# Patient Record
Sex: MALE | Race: WHITE | NOT HISPANIC OR LATINO | URBAN - METROPOLITAN AREA
[De-identification: names, ages, dates, MRNs, and addresses within clinical notes are randomized per-mention and may not be internally consistent; named-entity substitution may affect disease eponyms.]

---

## 2017-11-28 ENCOUNTER — IMPORTED ENCOUNTER (OUTPATIENT)
Dept: URBAN - METROPOLITAN AREA CLINIC 1 | Facility: CLINIC | Age: 72
End: 2017-11-28

## 2017-11-28 PROBLEM — D31.30: Noted: 2017-11-28

## 2017-11-28 PROBLEM — H04.123: Noted: 2017-11-28

## 2017-11-28 PROBLEM — H25.813: Noted: 2017-11-28

## 2017-11-28 PROBLEM — H35.372: Noted: 2017-11-28

## 2017-11-28 PROBLEM — H16.143: Noted: 2017-11-28

## 2017-11-28 PROBLEM — H43.812: Noted: 2017-11-28

## 2017-11-28 PROCEDURE — 92014 COMPRE OPH EXAM EST PT 1/>: CPT

## 2017-11-28 PROCEDURE — 92015 DETERMINE REFRACTIVE STATE: CPT

## 2017-11-28 NOTE — PATIENT DISCUSSION
1.  Osteoma OS- stable. Observe 2. Cataract OU: Observe for now without intervention. The patient was advised to contact us if any change or worsening of vision3. YUDI w/ PEK OU- Cont ATs BID OU Routinely. 4.  PVD w/o Tear OS- stable RD Precautions 5. Asteroid Hyalosis OS- RD precautions 6. Epiretinal Membrane OS - Observe for change. Letter to PCP Return for an appointment in 1 yr 30 glare with Dr. Petra Coleman.

## 2018-01-03 ENCOUNTER — HOSPITAL ENCOUNTER (OUTPATIENT)
Dept: PHYSICAL THERAPY | Age: 73
Discharge: HOME | End: 2018-01-03
Payer: MEDICARE

## 2018-01-03 PROCEDURE — G8978 MOBILITY CURRENT STATUS: HCPCS

## 2018-01-03 PROCEDURE — 97162 PT EVAL MOD COMPLEX 30 MIN: CPT

## 2018-01-03 PROCEDURE — 97112 NEUROMUSCULAR REEDUCATION: CPT

## 2018-01-03 PROCEDURE — G8979 MOBILITY GOAL STATUS: HCPCS

## 2018-01-03 NOTE — PROGRESS NOTES
[facility] AT [address], [address] [place] [address]  Phone: [phone]   [phone]  PLAN OF CARE / [address] [place] [address] PHYSICAL THERAPY SERVICES  Patient Name: Abdoulaye Carlin : 1945   Medical   Diagnosis: Vertigo [R42] Treatment Diagnosis: R42   Onset Date:      Referral Source: Cass Gaines MD [facility]): 1/3/2018   Prior Hospitalization: See medical history Provider #: [id number]   Prior Level of Function: Independent w/ ADL's   Comorbidities: HTN, acoustic neuroma   Medications: Verified on Patient Summary List   The Plan of Care and following information is based on the information from the initial evaluation.   ===========================================================================================  Assessment / key information:  Patient is a 68 y/o male presenting with chief c/o imbalance and occasional dizziness. Pt reports this has occurred increasingly over the past year. 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 Pt reports intermittent feeling of veering or imbalance with walking, changing positions, looking up/down, or turning. He denies any falls, and does not use an assistive device. He denies pain (0/10). 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 Patient presents with decreased gait speed and decreased foot clearance. Functional Gait Assessment score is 19/30 indicating elevated fall risk. Pt exhibits normal smooth pursuit and ocular movement with testing, but did show some increased sway attempting stationary gaze stabilization in standing position. Deficits and treatment plan were discussed, and patient was instructed in home exercises for vestibular training.  The patient should benefit from therapy services to progress toward functional goals and improve quality of life.  ===========================================================================================  History MEDIUM  Complexity : 1-2 comorbidities / personal factors will impact the outcome/ POC ; Examination HIGH Complexity : 4+ Standardized tests and measures addressing body structure, function, activity limitation and / or participation in recreation ; Presentation MEDIUM Complexity : Evolving with changing characteristics ; Decision Making MEDIUM Complexity : FOTO score of 26-74; Complexity MEDIUM  Problem List: impaired gait/ balance, decrease ADL/ functional abilitiies and decrease activity tolerance   Treatment Plan may include any combination of the following: Therapeutic activities, Neuromuscular re-education, Gait/balance training, Patient education and Functional mobility training  Patient / Family readiness to learn indicated by: asking questions, trying to perform skills and interest  Persons(s) to be included in education: patient (P)  Barriers to Learning/Limitations: None  Measures taken:    Patient Goal (s): Improve balance   Patient self reported health status: good  Rehabilitation Potential: good   Short Term Goals: To be accomplished in  4  treatments: Independent/compliant with gaze stabilization exercises 3x/day as directed  Progress to standing x2 card exercises without loss of balance to demonstrate equilibrium improvement   Long Term Goals: To be accomplished in  8  treatments:  Improve FOTO outcome score by 10% to indicate a significant improvement in ADL function  Pt will be able to perform walking with head turning/tilting without loss of balance  Improve Functional Gait Assessment score to 22/30 to indicate reduced fall risk  Frequency / Duration:   Patient to be seen  1  time per week for 8  weeks:  Patient / Caregiver education and instruction: activity modification and exercises  G-Codes (GP): Mobility [id number] Current  CJ= 20-39%   Goal  CJ= 20-39%.   The severity rating is based on the Other Functional Gait Assessment    Therapist Signature: Jennifer Booth PT, OCS Date: 1/4/0756   Certification Period: 1/3/18-4/2/18 Time: 3:20 PM   ===========================================================================================  I certify that the above Physical Therapy Services are being furnished while the patient is under my care. I agree with the treatment plan and certify that this therapy is necessary. Physician Signature:        Date:       Time:     Please sign and return to [facility] or you may fax the signed copy to [phone]. Thank you.

## 2018-01-08 ENCOUNTER — HOSPITAL ENCOUNTER (OUTPATIENT)
Dept: PHYSICAL THERAPY | Age: 73
Discharge: HOME | End: 2018-01-08
Payer: MEDICARE

## 2018-01-08 PROCEDURE — 97112 NEUROMUSCULAR REEDUCATION: CPT

## 2018-01-08 NOTE — PROGRESS NOTES
PT DAILY TREATMENT NOTE     Patient Name: Casey Cole  Date:2018  : 1945  [x]  Patient  Verified  [place]or: [place] MED[place]ARE / Plan: [place] MEDICARE PART A & B / Product Type: Medicare /    In time:11:33  Out time:12:01  Total Treatment Time (min): 28  Total Timed Codes (min): 28  1:1 Treatment Time (min): 28   Visit #: 2 of 8    Treatment Area: Vertigo [R42]    SUBJECTIVE  Pain Level (0-10 scale): 0  Any medication changes, allergies to medications, adverse drug reactions, diagnosis change, or new procedure performed?: [x] No    [] Yes (see summary sheet for update)  Subjective functional status/changes:   [] No changes reported  Pt reports no issues with HEP so far. The eye exercises did not cause dizziness. OBJECTIVE       28 min Neuromuscular Re-education:  []  See flow sheet :   Rationale: improve balance and increase proprioception  to improve the patients ability to safely perform standing/walking ADL's as well as basic changes of head and body positions    ther Objective/Functional Measures:     Pain Level (0-10 scale) post treatment: 0    ASSESSMENT/Changes in Function: Pt was progressed to x 2 card exercises today with no visible deficits. HEP updated. Pt requires frequent cueing with standing balance and gait activities to keep head at upright orientation and not downward. Patient will continue to benefit from skilled PT services to address functional mobility deficits and address imbalance/dizziness to attain remaining goals.      []  See Plan of Care  []  See progress note/recertification  []  See Discharge Summary         PLAN  []  Upgrade activities as tolerated     [x]  Continue plan of care  []  Update interventions per flow sheet       []  Discharge due to:_  []  Other:_      Livier Varghese, PT 2018  11:38 AM

## 2018-01-15 ENCOUNTER — HOSPITAL ENCOUNTER (OUTPATIENT)
Dept: PHYSICAL THERAPY | Age: 73
Discharge: HOME | End: 2018-01-15
Payer: MEDICARE

## 2018-01-15 PROCEDURE — 97112 NEUROMUSCULAR REEDUCATION: CPT

## 2018-01-15 NOTE — PROGRESS NOTES
PT DAILY TREATMENT NOTE -    Patient Name: Zay Dias  Date:1/15/2018  : 1945  [x]  Patient  Verified  Payor: [place] MEDICARE / Plan: [place] MEDICARE PART A & B / Product Type: Medicare /    In time:10:37  Out time:11:07  Total Treatment Time (min): 30  Total Timed Codes (min): 30  1:1 Treatment Time (min):    Visit #: 3 of 8    Treatment Area: Vertigo [R42]    SUBJECTIVE  Pain Level (0-10 scale): 0  Any medication changes, allergies to medications, adverse drug reactions, diagnosis change, or new procedure performed?: [x] No    [] Yes (see summary sheet for update)  Subjective functional status/changes:   [] No changes reported  My balance feels about the same as when I first started, it mainly gets me if I stand up too from a seated position. OBJECTIVE    30 min Neuromuscular Re-education:  []  See flow sheet :   Rationale: improve balance and increase proprioception  to improve the patients ability to safely perform standing/walking ADL's as well as basic changes of head and body positions        min Patient Education: [x] Review HEP    [] Progressed/Changed HEP based on:   [] positioning   [] body mechanics   [] transfers   [] heat/ice application        Other Objective/Functional Measures:     Pain Level (0-10 scale) post treatment: 0    ASSESSMENT/Changes in Function: Pt reports only reoccurrence of dizziness/disequilibrium symptoms with sit to stand transfers. Pt was also able to advance to addition of vestibular gaze card exercises x2 with min to mod challenge today with handouts given to had to HEP as well today. Will continue to progress/advance patient within current POC as tolerated with monitoring symptoms. Patient will continue to benefit from skilled PT services to modify and progress therapeutic interventions, analyze and cue movement patterns, address imbalance/dizziness and instruct in home and community integration to attain remaining goals.      []  See Plan of Care  []  See progress note/recertification  []  See Discharge Summary         Progress towards goals / Updated goals:      PLAN  [x]  Upgrade activities as tolerated     []  Continue plan of care  []  Update interventions per flow sheet       []  Discharge due to:_  []  Other:_      Germaine Preciado PTA 1/15/2018  10:36 AM

## 2018-01-22 ENCOUNTER — HOSPITAL ENCOUNTER (OUTPATIENT)
Dept: PHYSICAL THERAPY | Age: 73
Discharge: HOME | End: 2018-01-22
Payer: MEDICARE

## 2018-01-22 PROCEDURE — 97112 NEUROMUSCULAR REEDUCATION: CPT

## 2018-01-22 NOTE — PROGRESS NOTES
PT DAILY TREATMENT NOTE 8-    Patient Name: Lucillie Schilder  Date:2018  : 1945  [x]  Patient  Verified  Payor: [place] MEDICARE / Plan: [place] MEDICARE PART A & B / Product Type: Medicare /    In time:10:35  Out time:11:04  Total Treatment Time (min): 29  Total Timed Codes (min): 29  1:1 Treatment Time (min):    Visit #: 4 of 8    Treatment Area: Vertigo [R42]    SUBJECTIVE  Pain Level (0-10 scale): 0  Any medication changes, allergies to medications, adverse drug reactions, diagnosis change, or new procedure performed?: [x] No    [] Yes (see summary sheet for update)  Subjective functional status/changes:   [] No changes reported  I felt pretty good all weekend, no issues with my balance or any dizziness at all and I have been practicing with the new card exercises since I was here last time. OBJECTIVE     29 min Neuromuscular Re-education:  [x]  See flow sheet :   Rationale: improve balance and increase proprioception  to improve the patients ability to safely perform standing/walking ADL's as well as basic changes of head and body positions           min Patient Education: [x] Review HEP    [] Progressed/Changed HEP based on:   [] positioning   [] body mechanics   [] transfers   [] heat/ice application        Other Objective/Functional Measures:     Pain Level (0-10 scale) post treatment: 0    ASSESSMENT/Changes in Function: Pt presents with no new episodes of disequilibrium/vertigo and reports good compliance with addition of vestibular gaze card exercises x2 with HEP since last tx  Pt was also able to decrease ZOILA with most romberg stance exercises as well as increasing from sitting to standing with vestibular gaze card exercises x2 with min to mod challenge with proprioceptive/balance awareness today. Will continue to progress/advance patient within current POC as tolerated with monitoring symptoms.      Patient will continue to benefit from skilled PT services to modify and progress therapeutic interventions, analyze and cue movement patterns, address imbalance/dizziness and instruct in home and community integration to attain remaining goals.      []  See Plan of Care  [x]  See progress note/recertification  []  See Discharge Summary         Progress towards goals / Updated goals:      PLAN  [x]  Upgrade activities as tolerated     []  Continue plan of care  []  Update interventions per flow sheet       []  Discharge due to:_  []  Other:_      Joel Way, PTA 1/22/2018  10:37 AM

## 2018-01-29 ENCOUNTER — HOSPITAL ENCOUNTER (OUTPATIENT)
Dept: PHYSICAL THERAPY | Age: 73
Discharge: HOME | End: 2018-01-29
Payer: MEDICARE

## 2018-01-29 PROCEDURE — G8980 MOBILITY D/C STATUS: HCPCS

## 2018-01-29 PROCEDURE — G8979 MOBILITY GOAL STATUS: HCPCS

## 2018-01-29 PROCEDURE — 97530 THERAPEUTIC ACTIVITIES: CPT

## 2018-01-29 NOTE — PROGRESS NOTES
PT DAILY TREATMENT NOTE 8-14    Patient Name: Gal Sheets  Date:2018  : 1945  [x]  Patient  Verified  Payor: VA MEDICARE / Plan: VA MEDICARE PART A & B / Product Type: Medicare /    In time:10:32  Out time:10:55  Total Treatment Time (min): 23  Total Timed Codes (min): 23  1:1 Treatment Time (min):    Visit #: 5 of 8    Treatment Area: Vertigo [R42]    SUBJECTIVE  Pain Level (0-10 scale): 0  Any medication changes, allergies to medications, adverse drug reactions, diagnosis change, or new procedure performed?: [x] No    [] Yes (see summary sheet for update)  Subjective functional status/changes:   [] No changes reported  I am going to have surgery on my right inner ear next week to remove a cyst and some floaters, so I will be out of therapy for at least 2 weeks after the surgery. OBJECTIVE      23 mins: Therapeutic Activities: Rationale: Through reassessment of all goals and performed Functional Gait Assessment for Discharge Summary         min Patient Education: [x] Review HEP    [] Progressed/Changed HEP based on:   [] positioning   [] body mechanics   [] transfers   [] heat/ice application        Other Objective/Functional Measures:     Pain Level (0-10 scale) post treatment:     ASSESSMENT/Changes in Function:    []  See Plan of Care  []  See progress note/recertification  [x]  See Discharge Summary         Progress towards goals / Updated goals:  See discharge summary for full final detailed progress towards all established goals. PLAN  []  Upgrade activities as tolerated     []  Continue plan of care  []  Update interventions per flow sheet       [x]  Discharge due to:Discharge from therapy at this time after completing 5 of 8 prescribed treatments due to upcoming change in medical status with upcoming inner ear surgery scheduled next week. Pt may possibly return after surgery pending medical status/MD approval.  []  Other:_      Germaine Preciado PTA 2018  10:25 AM

## 2018-01-29 NOTE — PROGRESS NOTES
[facility] AT [address], [address] [place] [address]  Phone: [phone]   [phone]  DISCHARGE SUMMARY  Patient Name: Abdoulaye Carlin : 1945   Treatment/Medical Diagnosis: Vertigo [R42]   Referral Source: Cass Gaines MD     Date of Initial Visit: 1/3/18 Attended Visits: 5 Missed Visits: 0     SUMMARY OF TREATMENT  Therapeutic exercise for proprioceptive/balance awareness including various romberg stance exercises on level and unstable surfaces, ambulation style dynamic balance drills, vestibular gaze card exercises and HEP. 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 Pt has although made slow but steady progress with advancing with Romberg stance activities and vestibular gaze card exercise program over the past few visits. Pt reports that he is scheduled to have invasive surgery on R inner ear next week which shoulder help further decrease equilibrium issues. Pt is being discharged from therapy at this time pending upcoming surgery with possibility of returning as needed post operatively. Goal/Measure of Progress Goal Met? 1.  Progress to standing x2 card exercises without loss of balance to demonstrate equilibrium improvement   Status at last Eval: Progressing Current Status: Met yes   2. Improve FOTO outcome score by 10% to indicate a significant improvement in ADL function   Status at last Eval: 69/100 Current Status: 69/100 no   3. Pt will be able to perform walking with head turning/tilting without loss of balance   Status at last Eval: Progressing Current Status: Not Met no   4.   Improve Functional Gait Assessment score to 22/30 to indicate reduced fall risk Status at last Eval: Initial FGA score=19/30 Current Status: Current FGA score=22/30 yes   G-codes=Mobility   Goal  CJ= 20-39%  D/C  CJ= 20-39%. The severity rating is based on the FOTO Score  RECOMMENDATIONS  Discharge from therapy at this time after completing 5 of 8 prescribed treatments due to upcoming change in medical status with upcoming inner ear surgery scheduled next week. Pt may possibly return after surgery pending medical status/MD approval.    If you have any questions/comments please contact us directly at [phone]. Thank you for allowing us to assist in the care of your patient. LPTA Signature: Claire Fish PTA  Date: 1/29/2018   PT Signature: DARIA Gunderson, [facility] [place] Time: 10:18 AM   NOTE TO PHYSICIAN:  PLEASE COMPLETE THE ORDERS BELOW AND FAX TO   [facility] UNIT: [phone]. If you are unable to process this request in 24 hours please contact our office: [phone].    ___ I have read the above report and request that my patient continue as recommended.   ___ I have read the above report and request that my patient continue therapy with the following changes/special instructions:_________________________________________________________   ___ I have read the above report and request that my patient be discharged from therapy.      Physician Signature:        Date:       Time:

## 2018-08-28 ENCOUNTER — IMPORTED ENCOUNTER (OUTPATIENT)
Dept: URBAN - METROPOLITAN AREA CLINIC 1 | Facility: CLINIC | Age: 73
End: 2018-08-28

## 2018-08-28 PROBLEM — H35.373: Noted: 2018-08-28

## 2018-08-28 PROBLEM — H25.813: Noted: 2018-08-28

## 2018-08-28 PROBLEM — H16.143: Noted: 2018-08-28

## 2018-08-28 PROBLEM — H04.123: Noted: 2018-08-28

## 2018-08-28 PROBLEM — H43.812: Noted: 2018-08-28

## 2018-08-28 PROBLEM — H43.22: Noted: 2018-08-28

## 2018-08-28 PROCEDURE — 92015 DETERMINE REFRACTIVE STATE: CPT

## 2018-08-28 PROCEDURE — 92012 INTRM OPH EXAM EST PATIENT: CPT

## 2018-08-28 NOTE — PATIENT DISCUSSION
1.  Cataract OU:  Visually Significant secondary to glare discussed the risks benefits alternatives and limitations of cataract surgery. The patient stated a full understanding and a desire to proceed with the procedure. The patient will need to return for preop appointment with cataract measurements and to have any additional questions answered and start pre-operative eye drops as directed. **Not MF candidateSchedule phaco PCL OS then ODOtherwise follow-up in 6 monhs for a dfe/glare2. Epiretinal Membrane OS>OD- New OD. Stable OS. Appears benign. Observe for change. 3. YUDI w/ PEK OU- Controlled. The continuation of artificial tears were recommended. 4.  Asteroid Hyalosis OS- Stable. Observe. 5. PVD w/o Tear OS- RD precautions. 6.  Osteoma OS- stable. Observe 7. Return for an appointment for [facility] H&P with Dr. Judith Morrissey.

## 2018-09-17 ENCOUNTER — IMPORTED ENCOUNTER (OUTPATIENT)
Dept: URBAN - METROPOLITAN AREA CLINIC 1 | Facility: CLINIC | Age: 73
End: 2018-09-17

## 2018-09-17 PROBLEM — H25.812: Noted: 2018-09-17

## 2018-09-17 PROCEDURE — 92136 OPHTHALMIC BIOMETRY: CPT

## 2018-09-17 NOTE — PATIENT DISCUSSION
Cataract OS: Visually Significant secondary to glare discussed the risks benefits alternatives and limitations of cataract surgery. The patient stated a full understanding and a desire to proceed with the procedure. The patient will need to start pre-operative eye drops as directed. Proceed w/ phaco PCL OS Pt understands they will need glasses post-op to achieve their best corrected vision. Return for an appointment for sx OS with Dr. [place] Ornelas.

## 2018-10-03 ENCOUNTER — IMPORTED ENCOUNTER (OUTPATIENT)
Dept: URBAN - METROPOLITAN AREA CLINIC 1 | Facility: CLINIC | Age: 73
End: 2018-10-03

## 2018-10-03 PROBLEM — H25.812 COMBINED FORM OF SENILE CATARACT OF LEFT EYE: Status: RESOLVED | Noted: 2018-10-03 | Resolved: 2018-10-03

## 2018-10-03 PROBLEM — H25.812 COMBINED FORM OF SENILE CATARACT OF LEFT EYE: Status: ACTIVE | Noted: 2018-10-03

## 2018-10-04 ENCOUNTER — IMPORTED ENCOUNTER (OUTPATIENT)
Dept: URBAN - METROPOLITAN AREA CLINIC 1 | Facility: CLINIC | Age: 73
End: 2018-10-04

## 2018-10-04 PROBLEM — Z96.1: Noted: 2018-10-04

## 2018-10-04 PROCEDURE — 99024 POSTOP FOLLOW-UP VISIT: CPT

## 2018-10-04 NOTE — PATIENT DISCUSSION
POD#1 CE/IOL OS (Standard)  doing well. Continue all 3 gtts as prescribed and until gone. Use Durezol BID OS Ilevro Qdaily OS Ocuflox TID OS Post op Warnings Reiterated RTC as scheduled

## 2018-10-09 ENCOUNTER — IMPORTED ENCOUNTER (OUTPATIENT)
Dept: URBAN - METROPOLITAN AREA CLINIC 1 | Facility: CLINIC | Age: 73
End: 2018-10-09

## 2018-10-09 PROBLEM — Z96.1: Noted: 2018-10-09

## 2018-10-09 PROBLEM — H25.811: Noted: 2018-10-09

## 2018-10-09 PROCEDURE — 92136 OPHTHALMIC BIOMETRY: CPT

## 2018-10-09 NOTE — PATIENT DISCUSSION
1. POW#1  CE/IOL OS doing well. Discontinue OcufloxContinue Lotemax/Durezol/Prednisolone BID until gone. Continue Prolensa/Ilevro/Acular QD until gone. 2. Cataract OD: Visually Significant secondary to glare discussed the risks benefits alternatives and limitations of cataract surgery. The patient stated a full understanding and a desire to proceed with the procedure. Start pre-operative eye drops as directed. Phaco PCL OD (STD IOL and Traditional Sx)Return for an appointment in [facility] with Dr. Claudeen Cobble.

## 2018-10-09 NOTE — PATIENT DISCUSSION
Cataract OD: Visually Significant secondary to glare discussed the risks benefits alternatives and limitations of cataract surgery. The patient stated a full understanding and a desire to proceed with the procedure. Start pre-operative eye drops as directed. Phaco PCL OD (STD IOL and Traditional Sx)

## 2018-10-17 ENCOUNTER — IMPORTED ENCOUNTER (OUTPATIENT)
Dept: URBAN - METROPOLITAN AREA CLINIC 1 | Facility: CLINIC | Age: 73
End: 2018-10-17

## 2018-10-17 PROBLEM — H25.811 COMBINED FORM OF SENILE CATARACT OF RIGHT EYE: Status: RESOLVED | Noted: 2018-10-17 | Resolved: 2018-10-17

## 2018-10-17 PROBLEM — H25.811 COMBINED FORM OF SENILE CATARACT OF RIGHT EYE: Status: ACTIVE | Noted: 2018-10-17

## 2018-10-18 ENCOUNTER — IMPORTED ENCOUNTER (OUTPATIENT)
Dept: URBAN - METROPOLITAN AREA CLINIC 1 | Facility: CLINIC | Age: 73
End: 2018-10-18

## 2018-10-18 PROBLEM — Z96.1: Noted: 2018-10-18

## 2018-10-18 PROCEDURE — 99024 POSTOP FOLLOW-UP VISIT: CPT

## 2018-10-18 NOTE — PATIENT DISCUSSION
1. POD#1 Phaco/ PCL Standard OD- doing well. Continue all 3 gtts as prescribed and until gone. Prednisolone BID OD Acular Qdaily OD Ocuflox TID OD  Post op Warnings Reiterated 2. POW#2 Phaco/ PCL Standard OS- doing well Continue gtts as prescribed and until gone.  Durezol BID OS Acular Qdaily OS Ocuflox TID OS Post op Warnings Reiterated RTC as scheduled

## 2018-11-08 ENCOUNTER — IMPORTED ENCOUNTER (OUTPATIENT)
Dept: URBAN - METROPOLITAN AREA CLINIC 1 | Facility: CLINIC | Age: 73
End: 2018-11-08

## 2018-11-08 PROBLEM — Z96.1: Noted: 2018-11-08

## 2018-11-08 PROCEDURE — 99024 POSTOP FOLLOW-UP VISIT: CPT

## 2018-11-08 NOTE — PATIENT DISCUSSION
POW#3 Phaco/ PCL OU (Standard OU) Doing well Finish PO meds per schedule MRX for glasses given Return for an appointment in May 30 with Dr. Kerry Madrigal.

## 2019-05-02 ENCOUNTER — IMPORTED ENCOUNTER (OUTPATIENT)
Dept: URBAN - METROPOLITAN AREA CLINIC 1 | Facility: CLINIC | Age: 74
End: 2019-05-02

## 2019-05-02 PROBLEM — H43.22: Noted: 2019-05-02

## 2019-05-02 PROBLEM — H35.373: Noted: 2019-05-02

## 2019-05-02 PROBLEM — H04.123: Noted: 2019-05-02

## 2019-05-02 PROBLEM — H16.143: Noted: 2019-05-02

## 2019-05-02 PROBLEM — Z96.1: Noted: 2019-05-02

## 2019-05-02 PROBLEM — H43.812: Noted: 2019-05-02

## 2019-05-02 PROCEDURE — 92014 COMPRE OPH EXAM EST PT 1/>: CPT

## 2019-05-02 NOTE — PATIENT DISCUSSION
1.  YUDI w/ PEK OU- Recommend ATs TID OU routinely 2. Pseudophakia OU - (Standard OU) 3.  Epiretinal Membrane OS>OD - Observe for change. 4. Asteroid Hyalosis OS- Observe 5. Osteoma OS- stable. Observe 6. PVD w/o Tear OS- RD precautions. Patient deferred Manifest Rx today. Return for an appointment in 1 year 27 with Dr. Lavelle Simmons.

## 2019-05-24 NOTE — PATIENT DISCUSSION
Cataract APPEARS VISUALLY SIGNIFICANT and patient advised to SEE  [facility] ([facility]) for evaluation and treatment.

## 2020-01-24 NOTE — PATIENT DISCUSSION
Cataract APPEARS VISUALLY SIGNIFICANT and patient advised to SEE  [facility] ([facility] for evaluation and treatment.

## 2020-05-05 ENCOUNTER — IMPORTED ENCOUNTER (OUTPATIENT)
Dept: URBAN - METROPOLITAN AREA CLINIC 1 | Facility: CLINIC | Age: 75
End: 2020-05-05

## 2020-05-05 PROBLEM — H43.812: Noted: 2020-05-05

## 2020-05-05 PROBLEM — H35.373: Noted: 2020-05-05

## 2020-05-05 PROBLEM — H43.22: Noted: 2020-05-05

## 2020-05-05 PROBLEM — H26.493: Noted: 2020-05-05

## 2020-05-05 PROCEDURE — 92015 DETERMINE REFRACTIVE STATE: CPT

## 2020-05-05 PROCEDURE — 92014 COMPRE OPH EXAM EST PT 1/>: CPT

## 2020-05-05 NOTE — PATIENT DISCUSSION
1.  Epiretinal Membrane OU OS>OD - Observe for change. 2. PCO OU- Visually Significant *secondary to glare*; schedule YAG Cap. Pros cons risks and benefits. 3.  YUDI w/ PEK OU- Recommend ATs TID OU routinely. 4.  Pseudophakia OU - (Standard OU) 5. Osteoma OS - Stable. Observe 6. Asteroid Hyalosis OS - Observe. 7. PVD w/o Tear OS- RD precautions. Return for an appointment YAG Cap OD then OS with Dr. Yarely Helms.

## 2020-05-13 ENCOUNTER — IMPORTED ENCOUNTER (OUTPATIENT)
Dept: URBAN - METROPOLITAN AREA CLINIC 1 | Facility: CLINIC | Age: 75
End: 2020-05-13

## 2020-05-20 ENCOUNTER — IMPORTED ENCOUNTER (OUTPATIENT)
Dept: URBAN - METROPOLITAN AREA CLINIC 1 | Facility: CLINIC | Age: 75
End: 2020-05-20

## 2020-05-29 ENCOUNTER — IMPORTED ENCOUNTER (OUTPATIENT)
Dept: URBAN - METROPOLITAN AREA CLINIC 1 | Facility: CLINIC | Age: 75
End: 2020-05-29

## 2020-05-29 PROCEDURE — 99024 POSTOP FOLLOW-UP VISIT: CPT

## 2020-07-24 NOTE — PATIENT DISCUSSION
Cataract APPEARS VISUALLY SIGNIFICANT and patient advised to SEE  [facility] ([facility]) for evaluation and treatment.

## 2021-04-13 ENCOUNTER — IMPORTED ENCOUNTER (OUTPATIENT)
Dept: URBAN - METROPOLITAN AREA CLINIC 1 | Facility: CLINIC | Age: 76
End: 2021-04-13

## 2021-04-13 PROBLEM — H35.373: Noted: 2021-04-13

## 2021-04-13 PROBLEM — Z96.1: Noted: 2021-04-13

## 2021-04-13 PROBLEM — H50.52: Noted: 2021-04-13

## 2021-04-13 PROBLEM — H43.22: Noted: 2021-04-13

## 2021-04-13 PROBLEM — H43.812: Noted: 2021-04-13

## 2021-04-13 PROCEDURE — 92015 DETERMINE REFRACTIVE STATE: CPT

## 2021-04-13 PROCEDURE — 92014 COMPRE OPH EXAM EST PT 1/>: CPT

## 2021-04-13 NOTE — PATIENT DISCUSSION
1.  Exophoria with intermittent breakdown. Fatigue related. 2.  Epiretinal Membrane OU - Observe for change. 3. PVD w/o Tear OS - Stable/ Observe. RD precautions. 4. Asteroid Hyalosis OS- observe5. Osteoma OS-0bserve stable. 6.  Pseudophakia OU - Standard/ H/o Yag Cap OU7. Return for an appointment for 1 year for a 30 with Dr. Bekah Araujo.

## 2021-09-17 NOTE — PATIENT DISCUSSION
IF PATIENT HAS CAT SURGERY RECOM REEVAL 1 MONTH LATER AT [facility].

## 2022-04-02 ASSESSMENT — VISUAL ACUITY
OD_SC: 20/20
OD_SC: 20/20
OS_SC: 20/30
OD_SC: 20/20
OD_GLARE: 20/50
OS_GLARE: 20/50
OD_SC: 20/25
OS_SC: 20/25-2
OS_GLARE: 20/60
OD_GLARE: 20/50+1
OS_SC: 20/40
OD_CC: 20/30
OD_SC: 20/25
OS_CC: 20/40
OD_CC: J1
OS_CC: 20/40
OS_PH: SC 20/25
OS_GLARE: 20/60+1
OS_SC: 20/25
OS_SC: 20/30
OS_CC: 20/50-2
OS_CC: 20/60
OD_GLARE: 20/60
OS_CC: J1
OS_SC: 20/30-2
OS_PH: SC 20/40
OD_SC: 20/25
OD_CC: 20/30+2

## 2022-04-02 ASSESSMENT — TONOMETRY
OD_IOP_MMHG: 13
OD_IOP_MMHG: 13
OS_IOP_MMHG: 13
OS_IOP_MMHG: 13
OD_IOP_MMHG: 12
OS_IOP_MMHG: 14
OD_IOP_MMHG: 13
OS_IOP_MMHG: 14
OS_IOP_MMHG: 15
OS_IOP_MMHG: 13
OD_IOP_MMHG: 14
OS_IOP_MMHG: 13
OS_IOP_MMHG: 14
OD_IOP_MMHG: 13
OD_IOP_MMHG: 15
OS_IOP_MMHG: 12
OS_IOP_MMHG: 15
OD_IOP_MMHG: 14

## 2022-11-10 NOTE — PROCEDURE NOTE: CLINICAL
PROCEDURE NOTE: Avastin () OD. Diagnosis: Neovascular AMD with Active CNV. Anesthesia: Topical. Prep: Betadine Drops. Prior to injection, risks/benefits/alternatives discussed including infection, loss of vision, hemorrhage, cataract, glaucoma, retinal tears or detachment. The off-label status of Intravitreal Avastin also was reviewed. The patient wished to proceed with treatment. Topical anesthesia was induced with Alcaine. Additional anesthesia was achieved using drop(s) or injection checked above. a drop of Povidone-iodine 5% ophthalmic solution was instilled over the injection site and in the inferior fornix. Betadine prep was performed. Using the syringe provided, Avastin 1.25 mg in 0.05 cc was injected into the vitreous cavity. The needle was passed 3.0 mm posterior to the limbus in pseudophakic patients, and 3.5 mm posterior to the lumbus in phakic patients. The remainder of the Avastin in the single-use vial was then discarded in a medical waste disposal container. Unused medication was discarded. Patient tolerated procedure well. There were no complications. Injection time: 2:15PM. Post-op instructions given. Expiration Date: 2/12/2023. The patient was instructed to return for re-evaluation in approximately 4-12 weeks depending on his/her condition and was told to call immediately if vision decreases and/or if his/her eye becomes red, painful, and/or light sensitive. The patient was instructed to go to the emergency room or call 911 if unable to reach the doctor within an hour or two of trying or calling. The patient was instructed to use Artificial Tears q.i.d. p.r.n for comfort. Sparkle Bustillo

## 2022-12-15 NOTE — PROCEDURE NOTE: CLINICAL
PROCEDURE NOTE: Avastin () OD. Diagnosis: Neovascular AMD with Active CNV. Anesthesia: Akten Gel 3.5%. Prep: Betadine Drops. Prior to injection, risks/benefits/alternatives discussed including infection, loss of vision, hemorrhage, cataract, glaucoma, retinal tears or detachment. The off-label status of Intravitreal Avastin also was reviewed. The patient wished to proceed with treatment. Topical anesthesia was induced with Alcaine. Additional anesthesia was achieved using drop(s) or injection checked above. a drop of Povidone-iodine 5% ophthalmic solution was instilled over the injection site and in the inferior fornix. Betadine prep was performed. Using the syringe provided, Avastin 1.25 mg in 0.05 cc was injected into the vitreous cavity. The needle was passed 3.0 mm posterior to the limbus in pseudophakic patients, and 3.5 mm posterior to the lumbus in phakic patients. The remainder of the Avastin in the single-use vial was then discarded in a medical waste disposal container. Unused medication was discarded. Patient tolerated procedure well. There were no complications. Injection time: 10:10AM. Post-op instructions given. Expiration Date: 3/7/2023. The patient was instructed to return for re-evaluation in approximately 4-12 weeks depending on his/her condition and was told to call immediately if vision decreases and/or if his/her eye becomes red, painful, and/or light sensitive. The patient was instructed to go to the emergency room or call 911 if unable to reach the doctor within an hour or two of trying or calling. The patient was instructed to use Artificial Tears q.i.d. p.r.n for comfort. Jorge L Nunez

## 2023-01-19 NOTE — PROCEDURE NOTE: CLINICAL
PROCEDURE NOTE: Avastin () OD. Diagnosis: Neovascular AMD with Active CNV. Anesthesia: Akten Gel 3.5%. Prep: Betadine Drops. Prior to injection, risks/benefits/alternatives discussed including infection, loss of vision, hemorrhage, cataract, glaucoma, retinal tears or detachment. The off-label status of Intravitreal Avastin also was reviewed. The patient wished to proceed with treatment. Topical anesthesia was induced with Alcaine. Additional anesthesia was achieved using drop(s) or injection checked above. a drop of Povidone-iodine 5% ophthalmic solution was instilled over the injection site and in the inferior fornix. Betadine prep was performed. Using the syringe provided, Avastin 1.25 mg in 0.05 cc was injected into the vitreous cavity. The needle was passed 3.0 mm posterior to the limbus in pseudophakic patients, and 3.5 mm posterior to the lumbus in phakic patients. The remainder of the Avastin in the single-use vial was then discarded in a medical waste disposal container. Unused medication was discarded. Patient tolerated procedure well. There were no complications. Injection time: 9:45AM. Post-op instructions given. Expiration Date: 2/16/2023. The patient was instructed to return for re-evaluation in approximately 4-12 weeks depending on his/her condition and was told to call immediately if vision decreases and/or if his/her eye becomes red, painful, and/or light sensitive. The patient was instructed to go to the emergency room or call 911 if unable to reach the doctor within an hour or two of trying or calling. The patient was instructed to use Artificial Tears q.i.d. p.r.n for comfort. Denver Bar

## 2023-01-19 NOTE — PATIENT DISCUSSION
----- Message from Jared Minaya sent at 6/22/2020  3:05 PM CDT -----  Regarding: BOTOX INJECTION APPROVAL  This Diagnosis Code G43.709 meets the approved list of the medicaid guidelines so a PA is NOT REQUIRED.  This Medicaid insurance must be checked month to month to see if patient Medicaid insurance changed and if they're still active with a new order.    Botox Injection has been approved for the following:    Valid From: 07/01/20 thru 07/31/20  Units: 200  Frequency/Duration: Every 13 Weeks for 1 year  Authorization No. N/A  Name of Ins: [facility] and State Medicaid  How to obtain: Buy and Bill Yes.    For your reference, the enrollment verification tracking number [id number] verifies the enrollment information below only for the following time frame of 07/01/2020 through 07/31/2020.        Search Results             Member Information    Member ID  Name            Date of Birth  County Medicare Beneficiary ID  Address                  Benefit Plan      Payer Benefit Plan Effective Date End Date       MEDICAID [employer]+ Standard Plan (No Copay) 07/01/2020 07/31/2020           Managed Care Enrollment      Provider Name  Program Telephone Number Effective Date End Date       [facility] [employer]AN  HMO - Medical/Dental [phone] 07/01/2020 07/31/2020        Thank you  Jared Spaulding         ARTIFICIAL TEARS to affected eye(s) as needed.

## 2024-03-13 NOTE — PATIENT DISCUSSION
Does NOT APPEAR VISUALLY SIGNIFICANT. Occupational Therapy    Patient not seen in therapy.     On hold due to medical condition    Occupational therapy eval withheld due to low Hgb at 6.8 .       OBJECTIVE                          Therapy procedure time and total treatment time can be found documented on the Time Entry flowsheet